# Patient Record
Sex: MALE | Race: BLACK OR AFRICAN AMERICAN | NOT HISPANIC OR LATINO | URBAN - METROPOLITAN AREA
[De-identification: names, ages, dates, MRNs, and addresses within clinical notes are randomized per-mention and may not be internally consistent; named-entity substitution may affect disease eponyms.]

---

## 2020-08-16 ENCOUNTER — EMERGENCY (EMERGENCY)
Facility: HOSPITAL | Age: 40
LOS: 1 days | Discharge: HOME | End: 2020-08-16
Attending: EMERGENCY MEDICINE
Payer: COMMERCIAL

## 2020-08-16 VITALS
SYSTOLIC BLOOD PRESSURE: 155 MMHG | WEIGHT: 229.94 LBS | HEART RATE: 86 BPM | TEMPERATURE: 98 F | DIASTOLIC BLOOD PRESSURE: 99 MMHG | OXYGEN SATURATION: 100 % | RESPIRATION RATE: 16 BRPM | HEIGHT: 72 IN

## 2020-08-16 VITALS
DIASTOLIC BLOOD PRESSURE: 90 MMHG | TEMPERATURE: 98 F | OXYGEN SATURATION: 100 % | SYSTOLIC BLOOD PRESSURE: 150 MMHG | RESPIRATION RATE: 16 BRPM | HEART RATE: 82 BPM

## 2020-08-16 PROCEDURE — 73560 X-RAY EXAM OF KNEE 1 OR 2: CPT

## 2020-08-16 PROCEDURE — 73590 X-RAY EXAM OF LOWER LEG: CPT | Mod: 26,LT

## 2020-08-16 PROCEDURE — 99284 EMERGENCY DEPT VISIT MOD MDM: CPT

## 2020-08-16 PROCEDURE — 73562 X-RAY EXAM OF KNEE 3: CPT

## 2020-08-16 PROCEDURE — 73590 X-RAY EXAM OF LOWER LEG: CPT

## 2020-08-16 PROCEDURE — 73560 X-RAY EXAM OF KNEE 1 OR 2: CPT | Mod: 26,LT

## 2020-08-16 PROCEDURE — 73562 X-RAY EXAM OF KNEE 3: CPT | Mod: 26,LT

## 2020-08-16 RX ORDER — ACETAMINOPHEN 500 MG
975 TABLET ORAL ONCE
Refills: 0 | Status: COMPLETED | OUTPATIENT
Start: 2020-08-16 | End: 2020-08-16

## 2020-08-16 RX ORDER — IBUPROFEN 200 MG
600 TABLET ORAL ONCE
Refills: 0 | Status: COMPLETED | OUTPATIENT
Start: 2020-08-16 | End: 2020-08-16

## 2020-08-16 RX ADMIN — Medication 600 MILLIGRAM(S): at 20:56

## 2020-08-16 RX ADMIN — Medication 975 MILLIGRAM(S): at 20:56

## 2020-08-16 NOTE — ED PROVIDER NOTE - PHYSICAL EXAMINATION
right leg: focal area of swelling, ttp, c/w contusion located at medial leg ~ quarter sized, no calf tenderness or swelling, no knee swelling, no laxity  left leg: focal area of swelling, ttp at medial left leg ~ 4 cm area, ttp, no left knee swelling or laxity, no calf tenderness
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
## 2020-08-16 NOTE — ED PROVIDER NOTE - CLINICAL SUMMARY MEDICAL DECISION MAKING FREE TEXT BOX
left leg pain - likely contusion, r/o fracture as patient is limping. plan for pain control and XR. No suspicion for DVT. Discussed taking proper dosage of ibuprofen or taking ibuprofen/ tylenol for pain control.

## 2020-08-16 NOTE — ED ADULT TRIAGE NOTE - BP NONINVASIVE SYSTOLIC (MM HG)
Pre-Operative Diagnosis: wound     Post-Operative Diagnosis: wound      Procedure Performed:   Procedure(s):  debridement left leg, split thickness skin graft from left thigh      Surgeon(s) and Role:  Panel 1:     * Miya Eller MD - Primary  Pa 155
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
## 2020-08-16 NOTE — ED ADULT NURSE NOTE - NSIMPLEMENTINTERV_GEN_ALL_ED
Implemented All Universal Safety Interventions:  [place] to call system. Call bell, personal items and telephone within reach. Instruct patient to call for assistance. Room bathroom lighting operational. Non-slip footwear when patient is off stretcher. Physically safe environment: no spills, clutter or unnecessary equipment. Stretcher in lowest position, wheels locked, appropriate side rails in place.
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
## 2020-08-16 NOTE — ED PROVIDER NOTE - PATIENT PORTAL LINK FT
You can access the FollowMyHealth Patient Portal offered by [facility] by registering at the following website: [email or web address]. By joining [employer]’s FollowMyHealth portal, you will also be able to view your health information using other applications (apps) compatible with our system.

## 2020-08-16 NOTE — ED PROVIDER NOTE - NSFOLLOWUPINSTRUCTIONS_ED_ALL_ED_FT
Please follow up with your doctor. Take tylenol (upto 1000 mg every 6 hours) and ibuprofen (600 mg every 6 hours) for pain. You should not experience chest pain, shortness of breath, fever or chills. If you do experience new symptoms, please seek medical attention by going to your doctor or returning to the emergency department.

## 2020-10-08 ENCOUNTER — EMERGENCY (EMERGENCY)
Facility: HOSPITAL | Age: 40
LOS: 1 days | Discharge: HOME | End: 2020-10-08
Attending: EMERGENCY MEDICINE
Payer: MEDICAID

## 2020-10-08 VITALS
SYSTOLIC BLOOD PRESSURE: 162 MMHG | DIASTOLIC BLOOD PRESSURE: 90 MMHG | OXYGEN SATURATION: 98 % | RESPIRATION RATE: 17 BRPM | HEART RATE: 78 BPM

## 2020-10-08 VITALS
WEIGHT: 229.94 LBS | SYSTOLIC BLOOD PRESSURE: 151 MMHG | HEIGHT: 72 IN | HEART RATE: 93 BPM | DIASTOLIC BLOOD PRESSURE: 96 MMHG | OXYGEN SATURATION: 100 % | RESPIRATION RATE: 18 BRPM | TEMPERATURE: 99 F

## 2020-10-08 LAB
ALBUMIN SERPL ELPH-MCNC: 4.9 G/DL — SIGNIFICANT CHANGE UP (ref 3.3–5)
ALP SERPL-CCNC: 85 U/L — SIGNIFICANT CHANGE UP (ref 40–120)
ALT FLD-CCNC: 19 U/L — SIGNIFICANT CHANGE UP (ref 10–45)
ANION GAP SERPL CALC-SCNC: 11 MMOL/L — SIGNIFICANT CHANGE UP (ref 5–17)
AST SERPL-CCNC: 24 U/L — SIGNIFICANT CHANGE UP (ref 10–40)
BASOPHILS # BLD AUTO: 0.05 K/UL — SIGNIFICANT CHANGE UP (ref 0–0.2)
BASOPHILS NFR BLD AUTO: 0.7 % — SIGNIFICANT CHANGE UP (ref 0–2)
BILIRUB SERPL-MCNC: 0.6 MG/DL — SIGNIFICANT CHANGE UP (ref 0.2–1.2)
BUN SERPL-MCNC: 12 MG/DL — SIGNIFICANT CHANGE UP (ref 7–23)
CALCIUM SERPL-MCNC: 9.7 MG/DL — SIGNIFICANT CHANGE UP (ref 8.4–10.5)
CHLORIDE SERPL-SCNC: 104 MMOL/L — SIGNIFICANT CHANGE UP (ref 96–108)
CO2 SERPL-SCNC: 24 MMOL/L — SIGNIFICANT CHANGE UP (ref 22–31)
CREAT SERPL-MCNC: 0.83 MG/DL — SIGNIFICANT CHANGE UP (ref 0.5–1.3)
D DIMER BLD IA.RAPID-MCNC: <150 NG/ML DDU — SIGNIFICANT CHANGE UP
EOSINOPHIL # BLD AUTO: 0.04 K/UL — SIGNIFICANT CHANGE UP (ref 0–0.5)
EOSINOPHIL NFR BLD AUTO: 0.6 % — SIGNIFICANT CHANGE UP (ref 0–6)
GLUCOSE SERPL-MCNC: 88 MG/DL — SIGNIFICANT CHANGE UP (ref 70–99)
HCT VFR BLD CALC: 43.4 % — SIGNIFICANT CHANGE UP (ref 39–50)
HGB BLD-MCNC: 14 G/DL — SIGNIFICANT CHANGE UP (ref 13–17)
IMM GRANULOCYTES NFR BLD AUTO: 0.3 % — SIGNIFICANT CHANGE UP (ref 0–1.5)
LYMPHOCYTES # BLD AUTO: 1.32 K/UL — SIGNIFICANT CHANGE UP (ref 1–3.3)
LYMPHOCYTES # BLD AUTO: 19.6 % — SIGNIFICANT CHANGE UP (ref 13–44)
MCHC RBC-ENTMCNC: 27 PG — SIGNIFICANT CHANGE UP (ref 27–34)
MCHC RBC-ENTMCNC: 32.3 GM/DL — SIGNIFICANT CHANGE UP (ref 32–36)
MCV RBC AUTO: 83.8 FL — SIGNIFICANT CHANGE UP (ref 80–100)
MONOCYTES # BLD AUTO: 1.05 K/UL — HIGH (ref 0–0.9)
MONOCYTES NFR BLD AUTO: 15.6 % — HIGH (ref 2–14)
NEUTROPHILS # BLD AUTO: 4.24 K/UL — SIGNIFICANT CHANGE UP (ref 1.8–7.4)
NEUTROPHILS NFR BLD AUTO: 63.2 % — SIGNIFICANT CHANGE UP (ref 43–77)
NRBC # BLD: 0 /100 WBCS — SIGNIFICANT CHANGE UP (ref 0–0)
PLATELET # BLD AUTO: 165 K/UL — SIGNIFICANT CHANGE UP (ref 150–400)
POTASSIUM SERPL-MCNC: 3.9 MMOL/L — SIGNIFICANT CHANGE UP (ref 3.5–5.3)
POTASSIUM SERPL-SCNC: 3.9 MMOL/L — SIGNIFICANT CHANGE UP (ref 3.5–5.3)
PROT SERPL-MCNC: 8.2 G/DL — SIGNIFICANT CHANGE UP (ref 6–8.3)
RAPID RVP RESULT: SIGNIFICANT CHANGE UP
RBC # BLD: 5.18 M/UL — SIGNIFICANT CHANGE UP (ref 4.2–5.8)
RBC # FLD: 14.9 % — HIGH (ref 10.3–14.5)
SARS-COV-2 RNA SPEC QL NAA+PROBE: SIGNIFICANT CHANGE UP
SODIUM SERPL-SCNC: 139 MMOL/L — SIGNIFICANT CHANGE UP (ref 135–145)
TROPONIN T, HIGH SENSITIVITY RESULT: <6 NG/L — SIGNIFICANT CHANGE UP (ref 0–51)
WBC # BLD: 6.72 K/UL — SIGNIFICANT CHANGE UP (ref 3.8–10.5)
WBC # FLD AUTO: 6.72 K/UL — SIGNIFICANT CHANGE UP (ref 3.8–10.5)

## 2020-10-08 PROCEDURE — 0225U NFCT DS DNA&RNA 21 SARSCOV2: CPT

## 2020-10-08 PROCEDURE — 86140 C-REACTIVE PROTEIN: CPT

## 2020-10-08 PROCEDURE — 71046 X-RAY EXAM CHEST 2 VIEWS: CPT | Mod: 26

## 2020-10-08 PROCEDURE — 85025 COMPLETE CBC W/AUTO DIFF WBC: CPT

## 2020-10-08 PROCEDURE — 71046 X-RAY EXAM CHEST 2 VIEWS: CPT

## 2020-10-08 PROCEDURE — 99284 EMERGENCY DEPT VISIT MOD MDM: CPT | Mod: 25

## 2020-10-08 PROCEDURE — 84484 ASSAY OF TROPONIN QUANT: CPT

## 2020-10-08 PROCEDURE — 80053 COMPREHEN METABOLIC PANEL: CPT

## 2020-10-08 PROCEDURE — 96374 THER/PROPH/DIAG INJ IV PUSH: CPT

## 2020-10-08 PROCEDURE — 93005 ELECTROCARDIOGRAM TRACING: CPT

## 2020-10-08 PROCEDURE — 85379 FIBRIN DEGRADATION QUANT: CPT

## 2020-10-08 PROCEDURE — 93010 ELECTROCARDIOGRAM REPORT: CPT

## 2020-10-08 PROCEDURE — 99285 EMERGENCY DEPT VISIT HI MDM: CPT

## 2020-10-08 RX ORDER — COLCHICINE 0.6 MG
1 TABLET ORAL
Qty: 60 | Refills: 0
Start: 2020-10-08 | End: 2020-11-06

## 2020-10-08 RX ORDER — KETOROLAC TROMETHAMINE 30 MG/ML
30 SYRINGE (ML) INJECTION ONCE
Refills: 0 | Status: DISCONTINUED | OUTPATIENT
Start: 2020-10-08 | End: 2020-10-08

## 2020-10-08 RX ADMIN — Medication 30 MILLIGRAM(S): at 13:53

## 2020-10-08 NOTE — ED ADULT NURSE NOTE - OBJECTIVE STATEMENT
40 y/o male with pmhx of LLE DVT post LUE surgery 2 years ago c/o L scapula pain that radiates to the R chest associated with sob x 2 days.  per pt, the pain is exacerbated when lying prone and trying to take a deep breath.  pt denies any dizziness, h/a, diaphoresis, or n/v at this time.  pt is awake, alert and responsive to all stimuli.  no respiratory distress noted.  skin is warm, dry and intact.  no cyanosis or pallor noted to mucous membranes. tenderness noted upon palpation to L scapula area.  no s/s of injury noted.  vss.  safety precautions in place.  family x 1 at bedside.  will continue to monitor.

## 2020-10-08 NOTE — ED PROVIDER NOTE - NS ED MD EM SELECTION
[address] Comprehensive
RISK                                                          Points  [  ] Previous VTE                                                3  [  ] Thrombophilia                                             2  [  ] Lower limb paralysis                                   2        (unable to hold up >15 seconds)    [  ] Current Cancer                                             2         (within 6 months)  [ x ] Immobilization > 24 hrs                              1  [  ] ICU/CCU stay > 24 hours                             1  [ x ] Age > 60                                                         1    IMPROVE VTE Score: 2  lovenox for VTE prophylaxis.

## 2020-10-08 NOTE — ED PROVIDER NOTE - CLINICAL SUMMARY MEDICAL DECISION MAKING FREE TEXT BOX
40 y/o M pmhx dvt x3 years ago provoked, no longer on a/c p/w chest tightness and pain +pleuritic, +positional, assoc with some L scapular pain. PE unremarkable. Clinical history and exam consistent with concern for PE vs pericarditis, still consider ACS vs costochondritis.

## 2020-10-08 NOTE — ED PROVIDER NOTE - PROGRESS NOTE DETAILS
patient feeling improved after toradol. work-up unremarkable aside from CRP mild elevation. m/p diagnosis at this time pericarditis, will treat with colchicine and ibuprofen and discussed with Dr. Hoffman for patient to follow-up with him for continued evaluation, treatment and echo. Patient in understanding and agreement with plan and is stable for d/c at this time. -Solange Kimbrough PA-C

## 2020-10-08 NOTE — ED PROVIDER NOTE - OBJECTIVE STATEMENT
38 y/o M pmhx DVT x3 years ago s/p arm surgery presenting with substernal rad to R side of chest pain. Patient reports that 2 days ago he started to feel some pain in his L upper back, scapular region that was sharp in nature, which is now radiating to the R front of his chest. He states that the pain is made worse with taking a deep breath and that doing activity, getting dressed, lifting things etc. has made it worse. He also reports that laying on his stomach makes the pain worse or laying flat in general he feels it more significantly. He states that when he is resting he does not feel short of breath. He denies any recent known illnesses or sick contacts. Denies fever, chills, sweating, nausea or vomiting.

## 2020-10-08 NOTE — ED PROVIDER NOTE - CARE PROVIDER_API CALL
Leroy Hoffman  CARDIOVASCULAR DISEASE  [address] [place] [address]  Phone: [phone]  Fax: [phone]  Follow Up Time:

## 2020-10-08 NOTE — ED PROVIDER NOTE - PATIENT PORTAL LINK FT
You can access the FollowMyHealth Patient Portal offered by [facility] by registering at the following website: [email or web address]. By joining [employer]’s FollowMyHealth portal, you will also be able to view your health information using other applications (apps) compatible with our system.

## 2020-10-08 NOTE — ED PROVIDER NOTE - ATTENDING CONTRIBUTION TO CARE
Pt with acute L posterior back pain and now R anterior chest pain, worse with movement and position and breathing, better with nothing.  No fever, dyspnea, vomiting.  No neuro sxs.  Equal pulses, clear lungs, no murmur or rub appreciated.  H/o DVT provoked post-surgery 3yrs ago, off a/c.  Consider MSK vs pericarditis vs PE unlikely Aodis or ACS.  Check d-dimer, labs, crp, trop.

## 2020-10-08 NOTE — ED PROVIDER NOTE - NS ED ROS FT
Constitutional: No fever or chills  Eyes: No visual changes, eye pain or redness  HEENT: No throat pain, ear pain, nasal pain. No nose bleeding.  CV: +chest pain. NO lower extremity edema  Resp: +sob with chest pain. no cough  GI: No abd pain. No nausea or vomiting. No diarrhea. No constipation.   : No dysuria, hematuria.   MSK: No musculoskeletal pain  Skin: No rash  Neuro: No headache. No numbness or tingling. No weakness.

## 2020-10-08 NOTE — ED PROVIDER NOTE - NSFOLLOWUPINSTRUCTIONS_ED_ALL_ED_FT
No signs of emergency medical condition on today's workup.  Presumptive diagnosis made, but further evaluation may be required by your primary care doctor or specialist for a definitive diagnosis.  Therefore, follow up as directed and if symptoms change/worsen or any emergency conditions, please return to the ER.   Your presumptive diagnosis is pericarditis  1. Take Colchicine twice daily for one month   2. Take motrin 600mg every 6 hours as needed for pain and anti-inflammatory   3. Follow-up with Dr. Hoffman, information provided to you in discharge paperwork for his office. Call his office today to make an appointment as soon as possible to follow-up for continued evaluation and treatment.

## 2020-10-09 RX ORDER — COLCHICINE 0.6 MG
1 TABLET ORAL
Qty: 60 | Refills: 0
Start: 2020-10-09 | End: 2020-11-07

## 2020-10-09 NOTE — ED POST DISCHARGE NOTE - RESULT SUMMARY
Called by patient who stated he attempted to  his medication yesterday and today however it was not at the pharmacy. Cancelled that order and sent prescription for same medication and dosage to new pharmacy.

## 2022-03-30 ENCOUNTER — EMERGENCY (EMERGENCY)
Facility: HOSPITAL | Age: 42
LOS: 1 days | Discharge: HOME | End: 2022-03-30
Attending: EMERGENCY MEDICINE
Payer: MEDICAID

## 2022-03-30 VITALS
TEMPERATURE: 98 F | HEART RATE: 97 BPM | HEIGHT: 72 IN | DIASTOLIC BLOOD PRESSURE: 84 MMHG | WEIGHT: 220.02 LBS | RESPIRATION RATE: 18 BRPM | SYSTOLIC BLOOD PRESSURE: 131 MMHG | OXYGEN SATURATION: 98 %

## 2022-03-30 VITALS
OXYGEN SATURATION: 99 % | DIASTOLIC BLOOD PRESSURE: 91 MMHG | HEART RATE: 87 BPM | RESPIRATION RATE: 18 BRPM | TEMPERATURE: 98 F | SYSTOLIC BLOOD PRESSURE: 161 MMHG

## 2022-03-30 PROCEDURE — 99283 EMERGENCY DEPT VISIT LOW MDM: CPT

## 2022-03-30 PROCEDURE — 99284 EMERGENCY DEPT VISIT MOD MDM: CPT

## 2022-03-30 RX ADMIN — Medication 40 MILLIGRAM(S): at 13:59

## 2022-03-30 NOTE — ED PROVIDER NOTE - NSFOLLOWUPINSTRUCTIONS_ED_ALL_ED_FT
1. Please follow up with orthopedics or sports medicine clinic as needed.    2. You can take advil as needed for pain. Please follow instructions on the packaging. 1. Please follow up with spine center within a week. You should receive a call from the hospital with an appointment or call [phone].     2. Please  steroids from the pharmacy and take 1 per day for the next 4 days.     3. You can take advil as needed for pain. Please follow instructions on the packaging.    4. Please return to the ER if you develop worsening pain, numbness and tingling in the hand or arm, weakness of the arm/hand/fingers, or anything of concern.

## 2022-03-30 NOTE — ED PROVIDER NOTE - PHYSICAL EXAMINATION
GEN: Patient awake alert NAD.     MSK: Moving all extremities, no edema. 5/5 strength and full ROM in all extremities. R infraspinatus ttp without spasm, full ROM and strength, NV intact, minimal pain with abduction      SKIN: warm, dry, no rash. GEN: Patient awake alert NAD.     MSK: Moving all extremities, no edema. 5/5 strength and full ROM in all extremities. R infraspinatus ttp without spasm, full ROM and strength, NV intact, minimal pain with abduction. Pain reproduced with prolonged neck rotation     SKIN: warm, dry, no rash. GEN: Patient awake alert NAD.     MSK: Moving all extremities, no edema. 5/5 strength and full ROM in all extremities. R infraspinatus ttp without spasm, full ROM and strength, NV intact, minimal pain with abduction. Pain reproduced with prolonged neck rotation     SKIN: warm, dry, no rash.    Attn - alert, nad, neck - no midline tenderness, tender medial to R scapula reproduces pt's symptoms.  AROM of neck reproduces pt's symptoms.  R shoulder - FROM, NT to palp. Motor 5/5, sensory intact.  paresthesia R thumb with neck mvm.  skin - NL.

## 2022-03-30 NOTE — ED ADULT NURSE NOTE - OBJECTIVE STATEMENT
Pt is 42 y/o male presenting to the ED c/o R shoulder pain x2 weeks. As per pt, intermittent R shoulder pain described as "numbness and tingling". Pt endorses radiation down R arm. Pt denies any PMH and daily medication use. Upon assessment, AxO x3, sitting up in bed, speaking in full sentences. Breathing spontaneously and unlabored. Abdomen is soft and non-tender to palpation. Pt ambulates w/o difficultly, reports LUE crush injury w/ limited use x5 years. Pt reports = sensation in extremities. Pt denies SOB, chest pain, n/v/d, dizziness, vision changes, and fevers. Safety and comfort measures provided- bed in lowest position, locked, and blanket given.

## 2022-03-30 NOTE — ED PROVIDER NOTE - PATIENT PORTAL LINK FT
You can access the FollowMyHealth Patient Portal offered by [facility] by registering at the following website: [email or web address]. By joining [employer]’s FollowMyHealth portal, you will also be able to view your health information using other applications (apps) compatible with our system.

## 2022-03-30 NOTE — ED PROVIDER NOTE - CLINICAL SUMMARY MEDICAL DECISION MAKING FREE TEXT BOX
Evaristo Huerta DO PGY-1: 41yM with no PMH presents to the ED c/o 2 wks atraumatic nonradiating R shoulder pain that is worse when laying on either shoulder. Pt denies inciting incident and is able to preform all normal ADLs. Pt is currently in minimal pain. R infraspinatus ttp without spasm, full ROM and strength, NV intact, minimal pain with abduction. Likely muscular in origin. Will dc pt with ortho f/u if needed. Evaristo Huerta DO PGY-1: 41yM with no PMH presents to the ED c/o 2 wks atraumatic nonradiating R shoulder pain that is worse when laying on either shoulder. Pt denies inciting incident and is able to preform all normal ADLs. Pt is currently in minimal pain. R infraspinatus ttp without spasm, full ROM and strength, NV intact, minimal pain with abduction. Pain reproduced with prolonged neck rotation. Likely cervical radiculopathy. Will give steroids. Send pt home on steroids and give spine f/u. Evaristo Huerta DO PGY-1: 41yM with no PMH presents to the ED c/o 2 wks atraumatic nonradiating R shoulder pain that is worse when laying on either shoulder. Pt denies inciting incident and is able to preform all normal ADLs. Pt is currently in minimal pain. R infraspinatus ttp without spasm, full ROM and strength, NV intact, minimal pain with abduction. Pain reproduced with prolonged neck rotation. Likely cervical radiculopathy. Will give steroids. Send pt home on steroids and give spine f/u.     Attn- right cervical radiculopathy - steroids.  f/u with spine ctr.

## 2022-03-30 NOTE — ED PROVIDER NOTE - ATTENDING CONTRIBUTION TO CARE
I performed a history and physical exam of the patient and discussed their management with the resident/ACP/medical or PA student. I reviewed the resident/ACP/medical or PA student's note and agree with the documented findings and plan of care except where noted.  attn -see MDM

## 2022-03-30 NOTE — ED PROVIDER NOTE - OBJECTIVE STATEMENT
41yM with no PMH presents to the ED c/o 2 wks atraumatic nonradiating R shoulder pain that is worse when laying on either shoulder. Pt denies inciting incident and is able to preform all normal ADLs. Pt is currently in minimal pain. Denies recent illness, fever, cough, congestion, CP, SOB, abdominal pain. n/v/d. 41yM with no PMH presents to the ED c/o 2 wks atraumatic nonradiating R shoulder pain with tingling that is worse when laying on either shoulder. Pt denies inciting incident and is able to preform all normal ADLs. Pt is currently in minimal pain. Denies recent illness, fever, cough, congestion, CP, SOB, abdominal pain. n/v/d. 41yM with no PMH presents to the ED c/o 2 wks atraumatic nonradiating R shoulder pain with tingling that is worse when laying on either shoulder. Pt denies inciting incident and is able to preform all normal ADLs. Pt is currently in minimal pain. Denies recent illness, fever, cough, congestion, CP, SOB, abdominal pain. n/v/d.     Attn - pt seen in [id number] - agree with above - pt c/o pain R medial  scapula with radiation to R shoulder and down arm intermittently x 2 weeks. pt is RHDom.  no injury or trauma. no prior. no relief with motrin or tylenol.

## 2025-02-22 ENCOUNTER — EMERGENCY (EMERGENCY)
Facility: HOSPITAL | Age: 45
LOS: 0 days | Discharge: HOME | End: 2025-02-22
Attending: EMERGENCY MEDICINE
Payer: MEDICAID

## 2025-02-22 VITALS
HEIGHT: 74 IN | SYSTOLIC BLOOD PRESSURE: 156 MMHG | WEIGHT: 218.04 LBS | OXYGEN SATURATION: 98 % | DIASTOLIC BLOOD PRESSURE: 91 MMHG | RESPIRATION RATE: 16 BRPM | TEMPERATURE: 98 F | HEART RATE: 85 BPM

## 2025-02-22 VITALS
SYSTOLIC BLOOD PRESSURE: 164 MMHG | OXYGEN SATURATION: 100 % | RESPIRATION RATE: 15 BRPM | TEMPERATURE: 99 F | HEART RATE: 89 BPM | DIASTOLIC BLOOD PRESSURE: 94 MMHG

## 2025-02-22 VITALS
SYSTOLIC BLOOD PRESSURE: 120 MMHG | HEIGHT: 74 IN | HEART RATE: 100 BPM | TEMPERATURE: 98 F | OXYGEN SATURATION: 97 % | RESPIRATION RATE: 18 BRPM | DIASTOLIC BLOOD PRESSURE: 84 MMHG | WEIGHT: 216.93 LBS
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
## 2025-02-22 DIAGNOSIS — R51.9 HEADACHE, UNSPECIFIED: ICD-10-CM
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
## 2025-02-22 DIAGNOSIS — S01.81XA LACERATION WITHOUT FOREIGN BODY OF OTHER PART OF HEAD, INITIAL ENCOUNTER: ICD-10-CM

## 2025-02-22 DIAGNOSIS — Z91.011 ALLERGY TO MILK PRODUCTS: ICD-10-CM

## 2025-02-22 DIAGNOSIS — Z91.014 ALLERGY TO MAMMALIAN MEATS: ICD-10-CM

## 2025-02-22 DIAGNOSIS — Z23 ENCOUNTER FOR IMMUNIZATION: ICD-10-CM
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
## 2025-02-22 DIAGNOSIS — R11.0 NAUSEA: ICD-10-CM

## 2025-02-22 DIAGNOSIS — S09.90XA UNSPECIFIED INJURY OF HEAD, INITIAL ENCOUNTER: ICD-10-CM

## 2025-02-22 PROBLEM — Z78.9 OTHER SPECIFIED HEALTH STATUS: Chronic | Status: ACTIVE | Noted: 2022-03-30

## 2025-02-22 PROCEDURE — 12011 RPR F/E/E/N/L/M 2.5 CM/<: CPT

## 2025-02-22 PROCEDURE — 99284 EMERGENCY DEPT VISIT MOD MDM: CPT | Mod: 25

## 2025-02-22 PROCEDURE — 99283 EMERGENCY DEPT VISIT LOW MDM: CPT

## 2025-02-22 RX ORDER — ACETAMINOPHEN 500 MG/5ML
975 LIQUID (ML) ORAL ONCE
Refills: 0 | Status: COMPLETED | OUTPATIENT
Start: 2025-02-22 | End: 2025-02-22

## 2025-02-22 RX ADMIN — Medication 975 MILLIGRAM(S): at 22:26

## 2025-02-22 RX ADMIN — Medication 975 MILLIGRAM(S): at 20:07

## 2025-04-12 ENCOUNTER — EMERGENCY (EMERGENCY)
Facility: HOSPITAL | Age: 45
LOS: 1 days | End: 2025-04-12
Attending: STUDENT IN AN ORGANIZED HEALTH CARE EDUCATION/TRAINING PROGRAM
Payer: MEDICAID

## 2025-04-12 VITALS
DIASTOLIC BLOOD PRESSURE: 94 MMHG | SYSTOLIC BLOOD PRESSURE: 158 MMHG | TEMPERATURE: 98 F | HEART RATE: 74 BPM | OXYGEN SATURATION: 99 % | RESPIRATION RATE: 20 BRPM

## 2025-04-12 VITALS
HEIGHT: 74 IN | DIASTOLIC BLOOD PRESSURE: 91 MMHG | SYSTOLIC BLOOD PRESSURE: 161 MMHG | OXYGEN SATURATION: 99 % | RESPIRATION RATE: 20 BRPM | WEIGHT: 210.1 LBS | HEART RATE: 97 BPM | TEMPERATURE: 98 F

## 2025-04-12 PROCEDURE — 10061 I&D ABSCESS COMP/MULTIPLE: CPT

## 2025-04-12 PROCEDURE — 99283 EMERGENCY DEPT VISIT LOW MDM: CPT | Mod: 25

## 2025-04-12 PROCEDURE — 87077 CULTURE AEROBIC IDENTIFY: CPT

## 2025-04-12 PROCEDURE — 99284 EMERGENCY DEPT VISIT MOD MDM: CPT | Mod: 25

## 2025-04-12 PROCEDURE — 87070 CULTURE OTHR SPECIMN AEROBIC: CPT

## 2025-04-12 PROCEDURE — 10060 I&D ABSCESS SIMPLE/SINGLE: CPT

## 2025-04-12 RX ORDER — CEPHALEXIN 250 MG/1
1 CAPSULE ORAL
Qty: 28 | Refills: 0
Start: 2025-04-12 | End: 2025-04-18

## 2025-04-12 RX ORDER — SULFAMETHOXAZOLE/TRIMETHOPRIM 800-160 MG
1 TABLET ORAL ONCE
Refills: 0 | Status: COMPLETED | OUTPATIENT
Start: 2025-04-12 | End: 2025-04-12

## 2025-04-12 RX ORDER — LIDOCAINE HCL/EPINEPHRINE/PF 1 %-1:200K
5 AMPUL (ML) INJECTION ONCE
Refills: 0 | Status: COMPLETED | OUTPATIENT
Start: 2025-04-12 | End: 2025-04-12

## 2025-04-12 RX ORDER — ACETAMINOPHEN 500 MG/5ML
975 LIQUID (ML) ORAL ONCE
Refills: 0 | Status: COMPLETED | OUTPATIENT
Start: 2025-04-12 | End: 2025-04-12

## 2025-04-12 RX ORDER — IBUPROFEN 200 MG
600 TABLET ORAL ONCE
Refills: 0 | Status: COMPLETED | OUTPATIENT
Start: 2025-04-12 | End: 2025-04-12

## 2025-04-12 RX ORDER — SULFAMETHOXAZOLE/TRIMETHOPRIM 800-160 MG
1 TABLET ORAL
Qty: 14 | Refills: 0
Start: 2025-04-12 | End: 2025-04-18

## 2025-04-12 RX ORDER — CEPHALEXIN 250 MG/1
500 CAPSULE ORAL ONCE
Refills: 0 | Status: COMPLETED | OUTPATIENT
Start: 2025-04-12 | End: 2025-04-12

## 2025-04-12 RX ADMIN — Medication 5 MILLILITER(S): at 13:38

## 2025-04-12 RX ADMIN — Medication 600 MILLIGRAM(S): at 13:37

## 2025-04-12 RX ADMIN — Medication 975 MILLIGRAM(S): at 13:37

## 2025-04-12 RX ADMIN — Medication 1 TABLET(S): at 15:03

## 2025-04-12 RX ADMIN — CEPHALEXIN 500 MILLIGRAM(S): 250 CAPSULE ORAL at 15:03

## 2025-04-12 NOTE — ED PROVIDER NOTE - OBJECTIVE STATEMENT
43yo male with PMHx of DVT s/p left forearm surgery (2017) presents to ED with left armpit swelling and pain for 3days. Reports he noticed a small pimple on left armpit 3days ago and it's progressively been larger with worsening pain. Denies previous abscess. Denies fever, chills, or recent sickness.

## 2025-04-12 NOTE — ED PROVIDER NOTE - ATTENDING APP SHARED VISIT CONTRIBUTION OF CARE
I have personally performed a face to face medical and diagnostic evaluation of the patient. I have discussed with and reviewed the Resident's and/or ACP's and/or Medical/PA/NP student's note and agree with the History, ROS, Physical Exam and MDM unless otherwise indicated. A brief summary of my personal evaluation and impression can be found below.     44-year-old male presents emergency department with left armpit pain and swelling worsening over the last few days, which initially started as a pimple and got gradually larger in size, patient reports some mild purulent discharge self draining over the last day.  No associated fevers.  No paresthesias or weakness.  Has not had abscess to the armpit before.    Physical exam shows fluctuance and induration at the left armpit, mild overlying erythema.  Distal pulses intact.  Well-appearing, nontoxic.    Given history and physical differential includes but is not limited to abscess, cellulitis.  Lower concern for at HS given appearance and first-time episode.  Will plan for incision and drainage and abx and dc with wound check/outpt fu.

## 2025-04-12 NOTE — ED PROCEDURE NOTE - CPROC ED POST PROC CARE GUIDE1
Verbal/written post procedure instructions were given to patient/caregiver./Instructed patient/caregiver to follow-up with primary care physician./Instructed patient/caregiver regarding signs and symptoms of infection./Elevate the injured extremity as instructed./Keep the cast/splint/dressing clean and dry. Instructed patient/caregiver to follow-up with primary care physician. Verbal/written post procedure instructions were given to patient/caregiver./Instructed patient/caregiver to follow-up with primary care physician./Instructed patient/caregiver regarding signs and symptoms of infection./Keep the cast/splint/dressing clean and dry.

## 2025-04-12 NOTE — ED PROVIDER NOTE - PROGRESS NOTE DETAILS
Franci Ford DO (Attending): I&D performed, Antibiotics given.  Antibiotics also sent to pharmacy.  Patient stable for discharge, strict return precautions given, verbalized understanding. Pt states feeling improved after I&D.

## 2025-04-12 NOTE — ED ADULT NURSE NOTE - NS ED NURSE RECORD ANOTHER HT AND WT
Pt says he wants to switch the pharmacy he uses for Acitretin 25 mg. Due to his insurance, he wants a script sent to [facility], [address] Phone [phone] and Fax [phone].    Pt phone [phone]   Yes

## 2025-04-12 NOTE — ED ADULT NURSE NOTE - NURSING MUSC STRENGTH
Anesthesia Volume In Cc (Will Not Render If 0): 0.3 hand grasp, leg strength strong and equal bilaterally

## 2025-04-12 NOTE — ED PROVIDER NOTE - NSFOLLOWUPINSTRUCTIONS_ED_ALL_ED_FT
You were seen in the emergency department today for an abscess at your armpit.  This was drained at bedside and you were given antibiotics.  The remainder of your antibiotics were sent to your pharmacy, please take them as prescribed.  Return for any worsening symptoms such as those listed below and that was discussed.    Abscess    An abscess is an infected area that contains a collection of pus and debris. It can occur in almost any part of the body and occurs when the tissue gets infection. Symptoms include a painful mass that is red, warm, tender that might break open and HAVE drainage. If your health care provider gave you antibiotics make sure to take the full course and do not stop even if feeling better.     SEEK IMMEDIATE MEDICAL CARE IF YOU HAVE ANY OF THE FOLLOWING SYMPTOMS: chills, fever, muscle aches, or red streaking from the area. You were seen in the emergency department today for an abscess at your armpit.  This was drained at bedside and you were given antibiotics.  The remainder of your antibiotics were sent to your pharmacy, please take them as prescribed.  Return for any worsening symptoms such as those listed below and that was discussed.  Return to ED in 2days for wound check and packing removal.  Follow up with dermatology for reevaluation, call Monday for appointment.    Abscess    An abscess is an infected area that contains a collection of pus and debris. It can occur in almost any part of the body and occurs when the tissue gets infection. Symptoms include a painful mass that is red, warm, tender that might break open and HAVE drainage. If your health care provider gave you antibiotics make sure to take the full course and do not stop even if feeling better.     SEEK IMMEDIATE MEDICAL CARE IF YOU HAVE ANY OF THE FOLLOWING SYMPTOMS: chills, fever, muscle aches, or red streaking from the area.

## 2025-04-12 NOTE — ED PROVIDER NOTE - PATIENT PORTAL LINK FT
You can access the FollowMyHealth Patient Portal offered by [facility] by registering at the following website: [email or web address]. By joining [employer]’s FollowMyHealth portal, you will also be able to view your health information using other applications (apps) compatible with our system.

## 2025-04-12 NOTE — ED ADULT NURSE NOTE - OBJECTIVE STATEMENT
PT is a 44 year old A&OX4 male with PMH of DVT s/p left forearm surgery (2017) who presents to the ED with left armpit swelling and pain for 3 days. PT states he noticed a small pimple on his left axilla 3 days ago and it has been getting progressively larger with worsening pain. PT denies previous hx of abscess. PT denies chest pain, SOB, N/V/D, dizziness, fevers/chills, and recent illness. PT is resting comfortably in bed, breathing unlabored on room air, and speaking in complete sentences. Abdomen is soft, non-tender, and non-distended. Skin is warm and dry, no diaphoresis noted. No edema noted to B/L extremities. Abscess about 3 x 8cm in sized noted to left axilla with tenderness and localized erythema .Strong strength in B/L extremities, sensation intact. PT ambulatory with steady gait. Safety and comfort maintained. Family at the bedside.

## 2025-04-12 NOTE — ED PROVIDER NOTE - NSFOLLOWUPCLINICS_GEN_ALL_ED_FT
[facility] - [place]  [facility]  [address], [address]  [place] [address]  Phone: [phone]  Fax: [phone]

## 2025-04-12 NOTE — ED PROVIDER NOTE - PHYSICAL EXAMINATION
NAD. VSS. Afebrile. Neck supple. Lungs clear. ABD soft, non tender. +Left axillary; fluctuating abscess (3x8cm) with tender and localized eryth. Skin intact.

## 2025-04-14 ENCOUNTER — EMERGENCY (EMERGENCY)
Facility: HOSPITAL | Age: 45
LOS: 1 days | End: 2025-04-14
Attending: EMERGENCY MEDICINE
Payer: MEDICAID

## 2025-04-14 VITALS
RESPIRATION RATE: 18 BRPM | SYSTOLIC BLOOD PRESSURE: 130 MMHG | TEMPERATURE: 98 F | HEART RATE: 65 BPM | DIASTOLIC BLOOD PRESSURE: 78 MMHG | OXYGEN SATURATION: 97 %

## 2025-04-14 VITALS
HEART RATE: 83 BPM | DIASTOLIC BLOOD PRESSURE: 85 MMHG | TEMPERATURE: 98 F | HEIGHT: 74 IN | RESPIRATION RATE: 17 BRPM | OXYGEN SATURATION: 99 % | SYSTOLIC BLOOD PRESSURE: 131 MMHG | WEIGHT: 210.1 LBS

## 2025-04-14 LAB
CULTURE RESULTS: ABNORMAL
SPECIMEN SOURCE: SIGNIFICANT CHANGE UP

## 2025-04-14 PROCEDURE — 99283 EMERGENCY DEPT VISIT LOW MDM: CPT

## 2025-04-14 PROCEDURE — 99284 EMERGENCY DEPT VISIT MOD MDM: CPT

## 2025-04-14 RX ORDER — ACETAMINOPHEN 500 MG/5ML
975 LIQUID (ML) ORAL ONCE
Refills: 0 | Status: COMPLETED | OUTPATIENT
Start: 2025-04-14 | End: 2025-04-14

## 2025-04-14 RX ADMIN — Medication 975 MILLIGRAM(S): at 18:50

## 2025-04-14 NOTE — ED PROVIDER NOTE - OBJECTIVE STATEMENT
43 yo male in [place] [address] presents to the ER for evaluation of wound .  PT awake alert oriented x3  states " I was seen here tow days ago on Saturday and had a boil in my left armpit that was drained and packing was put in. I am here for a wound check now". Pt denies recent fevers or chills.  Wound oozing  purulent drainage.  Will change packing and dressing.  Pt reports moderate pain to site.  No surrounding cellulitis noted around wound site.

## 2025-04-14 NOTE — ED PROVIDER NOTE - NSFOLLOWUPINSTRUCTIONS_ED_ALL_ED_FT
Thank you for visiting our Emergency Department, it has been a pleasure taking part in your healthcare. Please follow up with your primary doctor within x48 hours.    Your discharge diagnosis is:    Return precautions to the Emergency Department include but are not limited to: unrelenting nausea, vomiting, fever, chills, chest pain, shortness of breath, dizziness, abdominal pain, worsening pain, syncope, blood in urine or stool, headache that doesn't resolve, numbness or tingling, loss of sensation, loss of motor function, or any other concerning symptoms.     REturn to the ER in two days for wound check and we will remove packing and occlusive dressing.  Do not remove it until you follow up in the ER in two days!   continue antibiotics as prescribed.     -- Please use 650mg Tylenol (also called acetaminophen) every 4 hours & 600mg Motrin (also called Advil or ibuprofen) every 6 hours as needed for pain/discomfort/swelling. You can get these without a prescription. Don't use more than 3500mg of Tylenol in any 24-hour period. Make sure your other prescription/over-the-counter medications don't contain any Tylenol so you don't take too much. If you have any stomach discomfort while taking Motrin, you can use TUMS or Pepcid or Zantac (these can all be bought without a prescription).       The hospital will contact you regarding a follow up appointment with surgery as this abscess can return in you raxilla.

## 2025-04-14 NOTE — ED PROVIDER NOTE - ATTENDING APP SHARED VISIT CONTRIBUTION OF CARE
Attending MD Pulido: I personally made/approved the management plan and take responsibility for the patient management.    Patient presents for follow-up of a sebaceous cyst that was incised and drained a couple of days ago. Patient reports the site has been draining since the procedure. Denies fevers or chills. States pain has improved since the procedure. Patient has been taking prescribed antibiotics (two different ones, twice daily) and Tylenol for pain. Patient states this is the first time experiencing this type of cyst. The cyst had been present for approximately five days before initial presentation.    Patient's vital signs are nonactionable.  Sitting in the stretcher no apparent distress.  Examination of left axilla reveals packing in place of incision, no surrounding erythema warmth or tenderness, packing removed, some expressible discharge and expression of sebaceous material from lesion in left axilla.  Packing to be replaced.    Patient presenting for evaluation of wound check from I&D performed 2 days prior of abscess to left axilla, patient seems to be responding well to antibiotics, no significant residual infection however there is some expressible sebaceous material from the left axilla, will repack wound to encourage ongoing drainage, advised patient to continue oral antibiotics and have advised patient to return to this emergency department for another wound recheck in 48 hours.  Patient was referred to dermatology, will also provide general surgery follow-up given high risk of recurrence of this given sebaceous material within abscess cavity.      *The above represents an initial assessment/impression. Please refer to progress notes for potential changes in patient clinical course*

## 2025-04-14 NOTE — ED PROVIDER NOTE - PATIENT PORTAL LINK FT
You can access the FollowMyHealth Patient Portal offered by [facility] by registering at the following website: [email or web address]. By joining [facility]’s FollowMyHealth portal, you will also be able to view your health information using other applications (apps) compatible with our system.

## 2025-04-14 NOTE — ED ADULT NURSE NOTE - NSFALLRISKFACTORS_ED_ALL_ED
Detail Level: Detailed
Quality 130: Documentation Of Current Medications In The Medical Record: Current Medications Documented
Quality 431: Preventive Care And Screening: Unhealthy Alcohol Use - Screening: Patient not identified as an unhealthy alcohol user when screened for unhealthy alcohol use using a systematic screening method
Quality 110: Preventive Care And Screening: Influenza Immunization: Influenza immunization was not ordered or administered, reason not given
Quality 226: Preventive Care And Screening: Tobacco Use: Screening And Cessation Intervention: Patient screened for tobacco use and is an ex/non-smoker
No indicators present

## 2025-04-14 NOTE — ED ADULT NURSE NOTE - NSFALLUNIVINTERV_ED_ALL_ED
Bed/Stretcher in lowest position, wheels locked, appropriate side rails in place/Call bell, personal items and telephone in reach/Instruct patient to call for assistance before getting out of bed/chair/stretcher/Non-slip footwear applied when patient is off stretcher/[place] to call system/Physically safe environment - no spills, clutter or unnecessary equipment/Purposeful proactive rounding/Room/bathroom lighting operational, light cord in reach

## 2025-04-14 NOTE — ED ADULT NURSE NOTE - OBJECTIVE STATEMENT
43 y/o M presents to the ED with complaints of wound check in L axillary. Pt reports having a procedure done on saturday for  cyst on L axillary. Pt denies fever, chills, and states that he is on anitbiotics at this time. Pt A&Ox3 well appearing, gross neuro intact, no difficulty speaking in complete sentences, pulses x 4, peñaloza x4

## 2025-04-14 NOTE — ED PROVIDER NOTE - NSTIMEPROVIDERCAREINITIATE_GEN_ER
"Chief Complaint   Patient presents with     Colposcopy       Initial /84   Pulse 110   Wt 88.5 kg (195 lb)   LMP 10/23/2019 (Exact Date)   BMI 33.47 kg/m   Estimated body mass index is 33.47 kg/m  as calculated from the following:    Height as of 19: 1.626 m (5' 4\").    Weight as of this encounter: 88.5 kg (195 lb).  BP completed using cuff size: regular    Questioned patient about current smoking habits.  Pt. has never smoked.          The following HM Due: NONE      The following patient reported/Care Every where data was sent to:  P ABSTRACT QUALITY INITIATIVES [[id number]]        N/a      Zulma Paz MA                " 14-Apr-2025 18:30

## 2025-04-16 ENCOUNTER — EMERGENCY (EMERGENCY)
Facility: HOSPITAL | Age: 45
LOS: 1 days | End: 2025-04-16
Attending: EMERGENCY MEDICINE
Payer: MEDICAID

## 2025-04-16 VITALS
SYSTOLIC BLOOD PRESSURE: 121 MMHG | WEIGHT: 210.1 LBS | DIASTOLIC BLOOD PRESSURE: 76 MMHG | HEART RATE: 82 BPM | TEMPERATURE: 98 F | RESPIRATION RATE: 18 BRPM | HEIGHT: 72 IN | OXYGEN SATURATION: 96 %

## 2025-04-16 VITALS
RESPIRATION RATE: 18 BRPM | SYSTOLIC BLOOD PRESSURE: 127 MMHG | OXYGEN SATURATION: 99 % | HEART RATE: 81 BPM | TEMPERATURE: 98 F | DIASTOLIC BLOOD PRESSURE: 82 MMHG

## 2025-04-16 PROCEDURE — 99283 EMERGENCY DEPT VISIT LOW MDM: CPT

## 2025-04-16 PROCEDURE — G0463: CPT
